# Patient Record
Sex: FEMALE | Race: OTHER
[De-identification: names, ages, dates, MRNs, and addresses within clinical notes are randomized per-mention and may not be internally consistent; named-entity substitution may affect disease eponyms.]

---

## 2019-02-05 ENCOUNTER — HOSPITAL ENCOUNTER (OUTPATIENT)
Dept: HOSPITAL 62 - WI | Age: 29
End: 2019-02-05
Attending: INTERNAL MEDICINE
Payer: OTHER GOVERNMENT

## 2019-02-05 DIAGNOSIS — R10.13: Primary | ICD-10-CM

## 2019-02-05 DIAGNOSIS — R11.2: ICD-10-CM

## 2019-02-05 PROCEDURE — 93976 VASCULAR STUDY: CPT

## 2019-02-05 PROCEDURE — 76700 US EXAM ABDOM COMPLETE: CPT

## 2019-02-05 NOTE — WOMENS IMAGING REPORT
EXAM DESCRIPTION:  U/S ABDOMEN COMPLETE W/DOPPLER



COMPLETED DATE/TIME:  2/5/2019 8:38 am



REASON FOR STUDY:  R10.13 EPIGASTRIC PAIN, R11.2 NAUSEA WITH VOMITING, UNSPECIFIED R10.13  EPIGASTRIC
 PAIN R11.2  NAUSEA WITH VOMITING, UNSPECIFIED



COMPARISON:  None.



TECHNIQUE:  Dynamic and static grayscale images acquired of the abdomen and recorded on PACS. Additio
nal selected color Doppler and spectral images recorded.

Note:  Study does not meet criteria for complete doppler/duplex scan



LIMITATIONS:  None.



FINDINGS:  PANCREAS: No masses. Visualized pancreatic duct normal caliber.

LIVER: No masses. Echotexture normal.

LIVER VASCULATURE: Normal directional flow of the main portal vein and hepatic veins.

GALLBLADDER: No stones. Normal wall thickness. No pericholecystic fluid.

ULTRASOUND-DETECTED KHANNA'S SIGN: Negative.

INTRAHEPATIC DUCTS AND COMMON DUCT: CBD and intrahepatic ducts normal caliber. No filling defects.

INFERIOR VENA CAVA: Normal flow.

AORTA: No aneurysm.

RIGHT KIDNEY:Normal size measuring 8.8 cm. Normal echogenicity. No solid or suspicious masses. No hyd
ronephrosis. No calcifications.

LEFT KIDNEY:  Normal size. Normal echogenicity measuring 8.6 cm. No solid or suspicious masses. No hy
dronephrosis. No calcifications.

SPLEEN: Normal size. No solid masses.

PERITONEAL AND PLEURAL SPACES: No ascites or effusions.

OTHER: No other significant finding.



IMPRESSION:  NORMAL ABDOMINAL ULTRASOUND.



TECHNICAL DOCUMENTATION:  JOB ID:  [id number]

 2011 [employer]- All Rights Reserved



Reading location - IP/workstation name: [email or web address]